# Patient Record
Sex: MALE | Race: WHITE | NOT HISPANIC OR LATINO | Employment: FULL TIME | URBAN - METROPOLITAN AREA
[De-identification: names, ages, dates, MRNs, and addresses within clinical notes are randomized per-mention and may not be internally consistent; named-entity substitution may affect disease eponyms.]

---

## 2020-03-30 ENCOUNTER — RECORDS - HEALTHEAST (OUTPATIENT)
Dept: LAB | Facility: CLINIC | Age: 55
End: 2020-03-30

## 2020-03-30 LAB
ALBUMIN SERPL-MCNC: 4.2 G/DL (ref 3.5–5)
ALP SERPL-CCNC: 46 U/L (ref 45–120)
ALT SERPL W P-5'-P-CCNC: 24 U/L (ref 0–45)
ANION GAP SERPL CALCULATED.3IONS-SCNC: 12 MMOL/L (ref 5–18)
AST SERPL W P-5'-P-CCNC: 18 U/L (ref 0–40)
BILIRUB SERPL-MCNC: 0.4 MG/DL (ref 0–1)
BUN SERPL-MCNC: 14 MG/DL (ref 8–22)
CALCIUM SERPL-MCNC: 9.6 MG/DL (ref 8.5–10.5)
CHLORIDE BLD-SCNC: 102 MMOL/L (ref 98–107)
CO2 SERPL-SCNC: 26 MMOL/L (ref 22–31)
CREAT SERPL-MCNC: 0.88 MG/DL (ref 0.7–1.3)
GFR SERPL CREATININE-BSD FRML MDRD: >60 ML/MIN/1.73M2
GLUCOSE BLD-MCNC: 84 MG/DL (ref 70–125)
POTASSIUM BLD-SCNC: 4.4 MMOL/L (ref 3.5–5)
PROT SERPL-MCNC: 6.9 G/DL (ref 6–8)
SODIUM SERPL-SCNC: 140 MMOL/L (ref 136–145)

## 2021-05-30 ENCOUNTER — RECORDS - HEALTHEAST (OUTPATIENT)
Dept: ADMINISTRATIVE | Facility: CLINIC | Age: 56
End: 2021-05-30

## 2022-09-16 NOTE — TELEPHONE ENCOUNTER
MEDICAL RECORDS REQUEST   [facility]  Urology Clinic  [address] [place] [address]  PHONE: [phone]  Fax: [phone]        FUTURE VISIT INFORMATION                                                   Johnnie Huggins, : 1965 scheduled for future visit at [facility]    APPOINTMENT INFORMATION:    Date: 2022    Provider:  Dior Sutherland MD    Reason for Visit/Diagnosis: Elevated PSA    RECORDS REQUESTED FOR VISIT                                                     NOTES  STATUS/DETAILS   OFFICE NOTE from other specialist  yes, 2022, 2021, 2019 -- Boston Early MD  @ [facility]   MEDICATION LIST  yes   LABS     PSA (LAB)  yes, 2022     PRE-VISIT CHECKLIST      Record collection complete Yes   Appointment appropriately scheduled           (right time/right provider) Yes   Joint diagnostic appointment coordinated correctly          (ensure right order & amount of time) Yes   MyChart activation If no, please explain pending   Questionnaire complete If no, please explain pending

## 2022-09-29 ENCOUNTER — VIRTUAL VISIT (OUTPATIENT)
Dept: UROLOGY | Facility: CLINIC | Age: 57
End: 2022-09-29
Payer: COMMERCIAL

## 2022-09-29 ENCOUNTER — TELEPHONE (OUTPATIENT)
Dept: UROLOGY | Facility: CLINIC | Age: 57
End: 2022-09-29

## 2022-09-29 ENCOUNTER — PRE VISIT (OUTPATIENT)
Dept: UROLOGY | Facility: CLINIC | Age: 57
End: 2022-09-29

## 2022-09-29 DIAGNOSIS — R97.20 ELEVATED PROSTATE SPECIFIC ANTIGEN (PSA): Primary | ICD-10-CM

## 2022-09-29 PROCEDURE — 99204 OFFICE O/P NEW MOD 45 MIN: CPT | Mod: GT | Performed by: UROLOGY

## 2022-09-29 RX ORDER — VENLAFAXINE HYDROCHLORIDE 75 MG/1
75 CAPSULE, EXTENDED RELEASE ORAL
COMMUNITY
Start: 2022-01-06

## 2022-09-29 RX ORDER — AMLODIPINE BESYLATE AND ATORVASTATIN CALCIUM 5; 10 MG/1; MG/1
TABLET, FILM COATED ORAL
COMMUNITY
Start: 2022-01-01

## 2022-09-29 RX ORDER — OMEGA-3/DHA/EPA/FISH OIL 300-1000MG
1200 CAPSULE ORAL
COMMUNITY

## 2022-09-29 RX ORDER — LOSARTAN POTASSIUM AND HYDROCHLOROTHIAZIDE 12.5; 5 MG/1; MG/1
TABLET ORAL
COMMUNITY
Start: 2022-01-01

## 2022-09-29 NOTE — NURSING NOTE
Patient denies any changes since echeck-in regarding medication and allergies.Patient also states all information entered during echeck-in remains accurate.    Patient states they are in [place] for today's virtual visit.     Idalia Hodgson, [facility] Visit [facility]

## 2022-09-29 NOTE — LETTER
9/29/2022       RE: Johnnie Huggins  [address] [address]     Dear Colleague,    Thank you for referring your patient, Johnnie Huggins, to the [facility]OLOG[facility] [place] at [facility]. Please see a copy of my visit note below.    Johnnie is a 56 year old who is being evaluated via a billable video visit.      How would you like to obtain your AVS? MyChart  If the video visit is dropped, the invitation should be resent by: Text to cell phone: [phone]  Will anyone else be joining your video visit? No        Video-Visit Details    Video Start Time: 230 PM     Type of service:  Video Visit        Chief Complaint:   Elevated PSA          Consult or Referral:     Mr. Johnnie Huggins is a 56 year old male seen in consultation from, second opinion          History of Present Illness:    Johnnie Huggins is a very pleasant 56 year old male who presents with elevated PSA. He reports lower urinary symptoms including frequency and weak stream. He drinks 24 oz of coffee every morning and a lot of water during the day. He suspects family history of prostate cancer in his father but he is not close to him and cannot confirm his suspicion.     He has seen Dr. Simms at [facility] and is scheduled for biopsy next week     Social status lives with his wife and son. Active lifestyle. Going through PT after sustaining back injury during volleyball game   ECOG status 0          Past Medical History:   Depression   Anxiety and HTN          Past Surgical History:   Appendectomy          Medications     Current Outpatient Medications   Medication     amLODIPine-atorvastatin (CADUET) 5-10 MG tablet     CHOLECALCIFEROL, VITAMIN D3, (VITAMIN D3 ORAL)     fish oil-omega-3 fatty acids 1000 MG capsule     losartan-hydrochlorothiazide (HYZAAR) 50-12.5 MG tablet     SUPER B COMPLEX/C PO     venlafaxine (EFFEXOR XR) 75 MG 24 hr capsule     cefuroxime (CEFTIN) 500  MG tablet     diazepam (VALIUM) 5 MG tablet     DULoxetine (CYMBALTA) 30 MG capsule     escitalopram oxalate (LEXAPRO) 20 MG tablet     LORazepam (ATIVAN) 1 MG tablet     multivit & mins-ferrous glucon 9 mg iron/15 mL Liqd     pantoprazole (PROTONIX) 40 MG tablet     traZODone (DESYREL) 100 MG tablet     No current facility-administered medications for this visit.            Family History:   No family history on file.         Social History:     Social History     Socioeconomic History     Marital status:      Spouse name: Not on file     Number of children: Not on file     Years of education: Not on file     Highest education level: Not on file   Occupational History     Not on file   Tobacco Use     Smoking status: Former Smoker     Types: Cigarettes     Quit date: 1/29/2007     Years since quitting: 15.6     Smokeless tobacco: Never Used   Substance and Sexual Activity     Alcohol use: Not on file     Drug use: Not on file     Sexual activity: Not on file   Other Topics Concern     Not on file   Social History Narrative     Not on file     Social Determinants of Health     Financial Resource Strain: Not on file   Food Insecurity: Not on file   Transportation Needs: Not on file   Physical Activity: Not on file   Stress: Not on file   Social Connections: Not on file   Intimate Partner Violence: Not on file   Housing Stability: Not on file            Allergies:   Patient has no known allergies.         Review of Systems     10 point ROS of systems including Constitutional, Eyes, Respiratory, Cardiovascular, Gastroenterology, Genitourinary, Integumentary, Muscularskeletal, Psychiatric were all negative except for pertinent positives noted in my HPI.          Physical Exam:   Vitals:  No vitals were obtained today due to virtual visit.    Physical Exam   GENERAL: Healthy, alert and no distress  EYES: Eyes grossly normal to inspection.  No discharge or erythema, or obvious scleral/conjunctival  abnormalities.  RESP: No audible wheeze, cough, or visible cyanosis.  No visible retractions or increased work of breathing.    SKIN: Visible skin clear. No significant rash, abnormal pigmentation or lesions.  NEURO: Cranial nerves grossly intact.  Mentation and speech appropriate for age.  PSYCH: Mentation appears normal, affect normal/bright, judgement and insight intact, normal speech and appearance well-groomed.          Labs and Pathology:    I reviewed all applicable laboratory and pathology data and went over findings with patient  Significant for mildly elevated PSA      Ref Range & Units 2 wk ago   Prostate Specific Antigen (PSA), Total 0 - 4 ng/mL 5.4 High     Prostate Specific Antigen (PSA), Free ng/mL 0.4    Prostate Specific Angtigen (PSA), Free Percent % 7                Imaging:    No relevant imaging to review today      Outside and Past Medical records:    I spent 10 minutes reviewing outside and past medical records.         Assessment and Plan:     Assessment:   56 year old male with elevated PSA     We discussed the emerging role of MRI in the diagnosis of prostate cancer and the potential for performing MRI-Ultrasound Fusion biopsy if suspicious lesions are noted.     We also discussed some of the new methods of risk stratification for prostate cancer including 4K, PHI, Exsome Dx to identify clinically significant and aggressive prostate cancer before biopsy. New data suggest that combination of these tests with MRI could avoid unnecessary prostate biopsy in significant percent of men with elevated PSA. Another available option in this group is MyProstateScore which provided 96% sensitivity and 97% negative predictive value, and would have prevented 32% of unnecessary biopsies, missing 3.7% of grade group ?2 cancers at the levels below 10.      Patient has decided he would like to proceed with prostate MRI as the initial step     He would like to move forward with his PSA work up before  considering any medication for his lower urinary tract symptoms       Plan:  Schedule for prostate MRI   Call with the results to discuss the next steps     45 total minutes spent on the date of the encounter including direct interaction with the patient, performing chart review, documentation and further activities as noted above.        Dior Sutherland MD   Department of Urology   [facility]         Video End Time:3:08 PM    Originating Location (pt. Location): Home    Distant Location (provider location):  [facility] [place]     Platform used for Video Visit: [employer]      Again, thank you for allowing me to participate in the care of your patient.      Sincerely,    Dior Sutherland MD

## 2022-09-29 NOTE — LETTER
Date:September 30, 2022      Patient was self referred, no letter generated. Do not send.        [facility] [place] [facility] Information

## 2022-09-29 NOTE — PROGRESS NOTES
Johnnie is a 56 year old who is being evaluated via a billable video visit.      How would you like to obtain your AVS? MyChart  If the video visit is dropped, the invitation should be resent by: Text to cell phone: [phone]  Will anyone else be joining your video visit? No        Video-Visit Details    Video Start Time: 230 PM     Type of service:  Video Visit        Chief Complaint:   Elevated PSA          Consult or Referral:     Mr. Johnnie Huggins is a 56 year old male seen in consultation from, second opinion          History of Present Illness:    Johnnie Huggins is a very pleasant 56 year old male who presents with elevated PSA. He reports lower urinary symptoms including frequency and weak stream. He drinks 24 oz of coffee every morning and a lot of water during the day. He suspects family history of prostate cancer in his father but he is not close to him and cannot confirm his suspicion.     He has seen Dr. Simms at [facility] and is scheduled for biopsy next week     Social status lives with his wife and son. Active lifestyle. Going through PT after sustaining back injury during volleyball game   ECOG status 0          Past Medical History:   Depression   Anxiety and HTN          Past Surgical History:   Appendectomy          Medications     Current Outpatient Medications   Medication     amLODIPine-atorvastatin (CADUET) 5-10 MG tablet     CHOLECALCIFEROL, VITAMIN D3, (VITAMIN D3 ORAL)     fish oil-omega-3 fatty acids 1000 MG capsule     losartan-hydrochlorothiazide (HYZAAR) 50-12.5 MG tablet     SUPER B COMPLEX/C PO     venlafaxine (EFFEXOR XR) 75 MG 24 hr capsule     cefuroxime (CEFTIN) 500 MG tablet     diazepam (VALIUM) 5 MG tablet     DULoxetine (CYMBALTA) 30 MG capsule     escitalopram oxalate (LEXAPRO) 20 MG tablet     LORazepam (ATIVAN) 1 MG tablet     multivit & mins-ferrous glucon 9 mg iron/15 mL Liqd     pantoprazole (PROTONIX) 40 MG tablet     traZODone (DESYREL) 100 MG tablet     No  current facility-administered medications for this visit.            Family History:   No family history on file.         Social History:     Social History     Socioeconomic History     Marital status:      Spouse name: Not on file     Number of children: Not on file     Years of education: Not on file     Highest education level: Not on file   Occupational History     Not on file   Tobacco Use     Smoking status: Former Smoker     Types: Cigarettes     Quit date: 1/29/2007     Years since quitting: 15.6     Smokeless tobacco: Never Used   Substance and Sexual Activity     Alcohol use: Not on file     Drug use: Not on file     Sexual activity: Not on file   Other Topics Concern     Not on file   Social History Narrative     Not on file     Social Determinants of Health     Financial Resource Strain: Not on file   Food Insecurity: Not on file   Transportation Needs: Not on file   Physical Activity: Not on file   Stress: Not on file   Social Connections: Not on file   Intimate Partner Violence: Not on file   Housing Stability: Not on file            Allergies:   Patient has no known allergies.         Review of Systems     10 point ROS of systems including Constitutional, Eyes, Respiratory, Cardiovascular, Gastroenterology, Genitourinary, Integumentary, Muscularskeletal, Psychiatric were all negative except for pertinent positives noted in my HPI.          Physical Exam:   Vitals:  No vitals were obtained today due to virtual visit.    Physical Exam   GENERAL: Healthy, alert and no distress  EYES: Eyes grossly normal to inspection.  No discharge or erythema, or obvious scleral/conjunctival abnormalities.  RESP: No audible wheeze, cough, or visible cyanosis.  No visible retractions or increased work of breathing.    SKIN: Visible skin clear. No significant rash, abnormal pigmentation or lesions.  NEURO: Cranial nerves grossly intact.  Mentation and speech appropriate for age.  PSYCH: Mentation appears normal,  affect normal/bright, judgement and insight intact, normal speech and appearance well-groomed.          Labs and Pathology:    I reviewed all applicable laboratory and pathology data and went over findings with patient  Significant for mildly elevated PSA      Ref Range & Units 2 wk ago   Prostate Specific Antigen (PSA), Total 0 - 4 ng/mL 5.4 High     Prostate Specific Antigen (PSA), Free ng/mL 0.4    Prostate Specific Angtigen (PSA), Free Percent % 7                Imaging:    No relevant imaging to review today      Outside and Past Medical records:    I spent 10 minutes reviewing outside and past medical records.         Assessment and Plan:     Assessment:   56 year old male with elevated PSA     We discussed the emerging role of MRI in the diagnosis of prostate cancer and the potential for performing MRI-Ultrasound Fusion biopsy if suspicious lesions are noted.     We also discussed some of the new methods of risk stratification for prostate cancer including 4K, PHI, Exsome Dx to identify clinically significant and aggressive prostate cancer before biopsy. New data suggest that combination of these tests with MRI could avoid unnecessary prostate biopsy in significant percent of men with elevated PSA. Another available option in this group is MyProstateScore which provided 96% sensitivity and 97% negative predictive value, and would have prevented 32% of unnecessary biopsies, missing 3.7% of grade group ?2 cancers at the levels below 10.      Patient has decided he would like to proceed with prostate MRI as the initial step     He would like to move forward with his PSA work up before considering any medication for his lower urinary tract symptoms       Plan:  Schedule for prostate MRI   Call with the results to discuss the next steps     45 total minutes spent on the date of the encounter including direct interaction with the patient, performing chart review, documentation and further activities as noted  above.        Dior Sutherland MD   Department of Urology   [facility]         Video End Time:3:08 PM    Originating Location (pt. Location): Home    Distant Location (provider location):  [facility] [place]     Platform used for Video Visit: [employer]

## 2022-11-16 ENCOUNTER — HOSPITAL ENCOUNTER (EMERGENCY)
Facility: CLINIC | Age: 57
Discharge: HOME | End: 2022-11-16
Attending: EMERGENCY MEDICINE | Admitting: EMERGENCY MEDICINE
Payer: COMMERCIAL

## 2022-11-16 VITALS — WEIGHT: 195 LBS | RESPIRATION RATE: 16 BRPM | TEMPERATURE: 98.4 F | OXYGEN SATURATION: 99 % | HEART RATE: 73 BPM

## 2022-11-16 DIAGNOSIS — R58 BLEEDING: ICD-10-CM

## 2022-11-16 PROCEDURE — 99282 EMERGENCY DEPT VISIT SF MDM: CPT

## 2022-11-16 ASSESSMENT — ENCOUNTER SYMPTOMS
ABDOMINAL PAIN: 0
FLANK PAIN: 0
BLOOD IN STOOL: 0
FEVER: 0
WOUND: 0
LIGHT-HEADEDNESS: 0
NAUSEA: 0
DYSURIA: 0
DIFFICULTY URINATING: 1
CHILLS: 0
DIARRHEA: 0
VOMITING: 0
BACK PAIN: 0
HEMATURIA: 1
SHORTNESS OF BREATH: 0

## 2022-11-16 NOTE — ED PROVIDER NOTES
EMERGENCY DEPARTMENT ENCOUNTER      NAME: Johnnie Huggins  AGE: 56 year old male  YOB: 1965  MRN: [id number]  EVALUATION DATE & TIME: No admission date for patient encounter.    PCP: No Ref-Primary, Physician    ED PROVIDER: Solange Colon PA-C      Chief Complaint   Patient presents with     Abnormal Bleeding Problem         FINAL IMPRESSION:  1. Bleeding          ED COURSE & MEDICAL DECISION MAKIN:14 AM Met with patient for initial interview. Plan for care discussed.  8:24 AM Staffed patient with Dr. Stone.  8:26 AM Dr. Stone met with patient. Patient elects to discharge and follow up at [facility] today.    Pertinent Labs & Imaging studies reviewed. (See chart for details)  56 year old male with a recent diagnosis of prostate cancer presents to the Emergency Department for evaluation of abnormal bleeding. Per chart review, patient underwent prostate biopsy and was diagnosed with prostate cancer on . Upon exam, patient is afebrile, hemodynamically stable, but appears very anxious. Brief PE done in triage,  exam planned once patient was roomed. Differential diagnosis includes external vs internal hemorrhoids, UTI, colon cancer, bladder cancer, bleeding secondary to recent biopsy. Plan was to order CBC, BMP, UA, and Fecal Occult Testing. This was relayed to patient by Dr. Stone, and after discussion, patient elected to discharge home without workup and go straight to [facility] for further evaluation. Recommend patient have work-up to determine source of bleed and confirm no evidence of anemia. Patient elected to leave against medical advice. Strongly advised patient follow up with his urologist/oncologist at [facility] as scheduled and return to an ER if any new or worsening symptoms develop. Patient was discharged AMA.     At the conclusion of the encounter I discussed the results of all of the tests and the disposition. The questions were answered. The patient or family acknowledged  understanding and was agreeable with the care plan.     MEDICATIONS GIVEN IN THE EMERGENCY:  Medications - No data to display    NEW PRESCRIPTIONS STARTED AT TODAY'S ER VISIT  Discharge Medication List as of 11/16/2022  8:37 AM             =================================================================    HPI    Patient information was obtained from: patient and patient's wife    Use of : N/A        Johnnie Huggins is a 56 year old male with a pertinent history of prostate cancer who presents to this ED for evaluation of abnormal bleeding. Per chart review, patient had a prostate biopsy on 11/04/22 and was diagnosed with primary prostate cancer. He has a PSMA PET scan scheduled later today at [facility]. Patient states he woke up this morning and noticed blood on his boxers, bedding, and genitals. He denies clots or current bleeding. He reports some associated penile/testicular discomfort. He admits to intermittent hematuria and mild dysuria over the last couple of days. He denies lightheadedness, fevers/chills, abdominal pain, diarrhea, nausea/vomiting, back or flank pain, chest pain, shortness of breath. Patient is not on any blood thinners.      REVIEW OF SYSTEMS   Review of Systems   Constitutional: Negative for chills and fever.   Respiratory: Negative for shortness of breath.    Cardiovascular: Negative for chest pain.   Gastrointestinal: Negative for abdominal pain, blood in stool, diarrhea, nausea and vomiting.   Genitourinary: Positive for difficulty urinating and hematuria. Negative for dysuria, flank pain, penile swelling and scrotal swelling.   Musculoskeletal: Negative for back pain.   Skin: Negative for wound.   Neurological: Negative for light-headedness.       PAST MEDICAL HISTORY:  No past medical history on file.    PAST SURGICAL HISTORY:  No past surgical history on file.        CURRENT MEDICATIONS:    amLODIPine-atorvastatin (CADUET) 5-10 MG tablet  cefuroxime (CEFTIN) 500 MG  tablet  CHOLECALCIFEROL, VITAMIN D3, (VITAMIN D3 ORAL)  diazepam (VALIUM) 5 MG tablet  DULoxetine (CYMBALTA) 30 MG capsule  escitalopram oxalate (LEXAPRO) 20 MG tablet  fish oil-omega-3 fatty acids 1000 MG capsule  LORazepam (ATIVAN) 1 MG tablet  losartan-hydrochlorothiazide (HYZAAR) 50-12.5 MG tablet  multivit & mins-ferrous glucon 9 mg iron/15 mL Liqd  pantoprazole (PROTONIX) 40 MG tablet  SUPER B COMPLEX/C PO  traZODone (DESYREL) 100 MG tablet  venlafaxine (EFFEXOR XR) 75 MG 24 hr capsule        ALLERGIES:  Allergies   Allergen Reactions     Pollen Extract Other (See Comments)     Sneezing and itchy watery eyes       FAMILY HISTORY:  No family history on file.    SOCIAL HISTORY:   Social History     Socioeconomic History     Marital status:    Tobacco Use     Smoking status: Former     Types: Cigarettes     Quit date: 1/29/2007     Years since quitting: 15.8     Smokeless tobacco: Never       VITALS:  Pulse 73   Temp 98.4  F (36.9  C) (Oral)   Resp 16   Wt 88.5 kg (195 lb)   SpO2 99%     PHYSICAL EXAM    Constitutional:  Alert, in no acute distress. Accompanied by his wife.  EYES: Conjunctivae clear.  HENT:  Atraumatic, normocephalic.  Respiratory:  Respirations even, unlabored, in no acute respiratory distress.  Cardiovascular:  Regular rate, good peripheral perfusion.  GI: Soft, flat, nondistended, no tenderness to palpation.   Musculoskeletal:  No edema. No cyanosis. Range of motion major extremities intact.    Integument: Warm, Dry, No erythema, No rash.   Neurologic:  Alert & oriented. No focal deficits noted.  Psych: Anxious.     Solange Colon PA-C  [facility] EMERGENCY ROOM  [address] [address] [phone]     Solange Colon PA-C  11/16/22 [id number]

## 2022-11-16 NOTE — ED PROVIDER NOTES
Emergency Department Staff Physician Note     I had a face to face encounter with this patient seen by the Advanced Practice Provider (ROSAS).  I have seen, examined, and discussed the patient with the ROSAS and agree with their assessment and plan of management.    Relevant HPI:     Johnnie Huggins is a 56 year old male who presents to the Emergency Department for evaluation of abnormal bleeding.     The patient woke up this morning with blood all over his lower abdomen and in his boxers. He is unsure where the bleeding is from, but endorses some discomfort in his genital area. He notes that he's been having intermittent episodes of blood in his urine. He also notes that he had a prostate biopsy 1.5 weeks ago (11/4/2022) at [facility] for his prostate cancer. He has an appointment to see a urologist, but he hasn't seen them yet.     Patient denies any diarrhea or abdominal pain.     I, Martin Bhakta, am serving as a scribe to document services personally performed by Warren Stone D.O., based on my observations and the provider's statements to me.   I, Warren Stone D.O., attest that Martin Bhakta was acting in a scribe capacity, has observed my performance of the services and has documented them in accordance with my direction.    ED Course:  8:15 AM I received the patient report from the ROSAS, Solange Colon, I agree with their assessment and plan of management, and I will see the patient.  8:25 AM I met with the patient to introduce myself, gather additional history, perform my initial exam, and discuss the plan.   8:33 AM Patient wished to be discharged so he could go to [facility] in [place].       Brief Physical Exam:   Vitals: Pulse 73   Temp 98.4  F (36.9  C) (Oral)   Resp 16   Wt 88.5 kg (195 lb)   SpO2 99%   General: Appears in no acute distress, awake, alert, interactive.  Eyes: Conjunctivae non-injected. Sclera anicteric.  HENT: Atraumatic, normocephalic  Neck: Supple, normal  ROM  Respiratory/Chest: Respiration unlabored, no tachypnea  Abdomen: non distended  Musculoskeletal: Normal extremities. No edema or erythema.  Skin: Normal color. No rash or diaphoresis.  Neurologic: Alert and oriented, face symmetric, moves all extremities spontaneously. Speech clear.  Psychiatric:  Affect normal, Judgment normal, Mood normal.         Impression / ED Plan:  I had a face to face encounter with this patient seen by the Advanced Practice Provider (ROSAS).  I have seen, examined, and discussed the patient with the ROSAS and agree with their assessment and plan of management. I personally saw the patient and performed a substantive portion of the visit including all aspects of the medical decision making.    Johnnie Huggins is a 56 year old male presents to the ED for evaluation of abnormal bleeding, possibly urinary source.  Patient had recent prostate biopsies, approximately 10 days ago, and started noticing blood this morning in his boxers.  Is uncertain whether it is coming from his penis or not, and he has some lower abdominal pressure.  Plan was to obtain labs, perform rectal exam to ensure there is no rectal bleeding, and then consult with urology.  However, before we had a chance to do this the patient decided that he would rather go to [facility] for evaluation and wished to be discharged AMA.  We did encourage him to do the testing however he wished to have it all done at a different facility and therefore we did allow him to leave AMA.    1. Bleeding        Warren Stone D.O.  Emergency Medicine  [facility] EMERGENCY ROOM  [address] [address]  [phone]  Dept: [phone]     Warren Stone, DO  11/16/22 1123

## 2022-11-16 NOTE — DISCHARGE INSTRUCTIONS
Please follow up with your urologist at [facility] as scheduled later today.     Return to the ER if any new or worsening symptoms develop including recurrent bleeding, penile/testicular/abdominal pain, chest pain, shortness of breath, fever/chills, lightheadedness.

## 2022-11-16 NOTE — ED TRIAGE NOTES
Pt had prostate biopsy 2 weeks ago.  Found out he has CA and was going to [place] later today.  This AM he woke with bleeding and blood in boxers and abd.  No wounds.  Believed to be bleeding from penis.   Triage Assessment     Row Name 11/16/22 0808       Triage Assessment (Adult)    Airway WDL WDL       Respiratory WDL    Respiratory WDL WDL       Skin Circulation/Temperature WDL    Skin Circulation/Temperature WDL WDL       Cardiac WDL    Cardiac WDL WDL       Peripheral/Neurovascular WDL    Peripheral Neurovascular WDL WDL       Cognitive/Neuro/Behavioral WDL    Cognitive/Neuro/Behavioral WDL WDL

## 2022-11-17 ENCOUNTER — LAB REQUISITION (OUTPATIENT)
Dept: LAB | Facility: CLINIC | Age: 57
End: 2022-11-17
Payer: COMMERCIAL

## 2022-11-17 DIAGNOSIS — J02.9 ACUTE PHARYNGITIS, UNSPECIFIED: ICD-10-CM

## 2022-11-17 PROCEDURE — 87081 CULTURE SCREEN ONLY: CPT | Mod: ORL | Performed by: FAMILY MEDICINE

## 2022-11-20 LAB — BACTERIA SPEC CULT: NORMAL

## 2022-12-26 ENCOUNTER — HEALTH MAINTENANCE LETTER (OUTPATIENT)
Age: 57
End: 2022-12-26

## 2024-02-04 ENCOUNTER — HEALTH MAINTENANCE LETTER (OUTPATIENT)
Age: 59
End: 2024-02-04

## 2024-09-03 ENCOUNTER — HOSPITAL ENCOUNTER (EMERGENCY)
Facility: CLINIC | Age: 59
Discharge: HOME | End: 2024-09-03
Attending: EMERGENCY MEDICINE | Admitting: EMERGENCY MEDICINE
Payer: COMMERCIAL

## 2024-09-03 ENCOUNTER — APPOINTMENT (OUTPATIENT)
Dept: ULTRASOUND IMAGING | Facility: CLINIC | Age: 59
End: 2024-09-03
Attending: EMERGENCY MEDICINE
Payer: COMMERCIAL

## 2024-09-03 ENCOUNTER — APPOINTMENT (OUTPATIENT)
Dept: GENERAL RADIOLOGY | Facility: CLINIC | Age: 59
End: 2024-09-03
Attending: EMERGENCY MEDICINE
Payer: COMMERCIAL

## 2024-09-03 VITALS
TEMPERATURE: 98.3 F | HEART RATE: 65 BPM | OXYGEN SATURATION: 94 % | SYSTOLIC BLOOD PRESSURE: 100 MMHG | RESPIRATION RATE: 18 BRPM | DIASTOLIC BLOOD PRESSURE: 69 MMHG

## 2024-09-03 DIAGNOSIS — D64.9 ANEMIA, UNSPECIFIED TYPE: ICD-10-CM

## 2024-09-03 DIAGNOSIS — I80.01 THROMBOPHLEBITIS OF SUPERFICIAL VEINS OF RIGHT LOWER EXTREMITY: ICD-10-CM

## 2024-09-03 LAB
ALBUMIN SERPL BCG-MCNC: 3.8 G/DL (ref 3.5–5.2)
ALP SERPL-CCNC: 66 U/L (ref 40–150)
ALT SERPL W P-5'-P-CCNC: 18 U/L (ref 0–70)
ANION GAP SERPL CALCULATED.3IONS-SCNC: 12 MMOL/L (ref 7–15)
AST SERPL W P-5'-P-CCNC: 20 U/L (ref 0–45)
BASOPHILS # BLD AUTO: 0 10E3/UL (ref 0–0.2)
BASOPHILS NFR BLD AUTO: 1 %
BILIRUB SERPL-MCNC: 0.3 MG/DL
BUN SERPL-MCNC: 16.1 MG/DL (ref 8–23)
CALCIUM SERPL-MCNC: 9 MG/DL (ref 8.8–10.4)
CHLORIDE SERPL-SCNC: 104 MMOL/L (ref 98–107)
CREAT SERPL-MCNC: 0.73 MG/DL (ref 0.67–1.17)
EGFRCR SERPLBLD CKD-EPI 2021: >90 ML/MIN/1.73M2
EOSINOPHIL # BLD AUTO: 0 10E3/UL (ref 0–0.7)
EOSINOPHIL NFR BLD AUTO: 0 %
ERYTHROCYTE [DISTWIDTH] IN BLOOD BY AUTOMATED COUNT: 16.4 % (ref 10–15)
GLUCOSE SERPL-MCNC: 122 MG/DL (ref 70–99)
HCO3 SERPL-SCNC: 23 MMOL/L (ref 22–29)
HCT VFR BLD AUTO: 28.5 % (ref 40–53)
HGB BLD-MCNC: 9.4 G/DL (ref 13.3–17.7)
IMM GRANULOCYTES # BLD: 0 10E3/UL
IMM GRANULOCYTES NFR BLD: 0 %
LYMPHOCYTES # BLD AUTO: 0.6 10E3/UL (ref 0.8–5.3)
LYMPHOCYTES NFR BLD AUTO: 8 %
MCH RBC QN AUTO: 31.9 PG (ref 26.5–33)
MCHC RBC AUTO-ENTMCNC: 33 G/DL (ref 31.5–36.5)
MCV RBC AUTO: 97 FL (ref 78–100)
MONOCYTES # BLD AUTO: 0.8 10E3/UL (ref 0–1.3)
MONOCYTES NFR BLD AUTO: 11 %
NEUTROPHILS # BLD AUTO: 5.7 10E3/UL (ref 1.6–8.3)
NEUTROPHILS NFR BLD AUTO: 80 %
NRBC # BLD AUTO: 0 10E3/UL
NRBC BLD AUTO-RTO: 0 /100
PLATELET # BLD AUTO: 231 10E3/UL (ref 150–450)
POTASSIUM SERPL-SCNC: 4.2 MMOL/L (ref 3.4–5.3)
PROT SERPL-MCNC: 5.8 G/DL (ref 6.4–8.3)
RBC # BLD AUTO: 2.95 10E6/UL (ref 4.4–5.9)
SODIUM SERPL-SCNC: 139 MMOL/L (ref 135–145)
WBC # BLD AUTO: 7.1 10E3/UL (ref 4–11)

## 2024-09-03 PROCEDURE — 80053 COMPREHEN METABOLIC PANEL: CPT | Performed by: EMERGENCY MEDICINE

## 2024-09-03 PROCEDURE — 250N000013 HC RX MED GY IP 250 OP 250 PS 637: Performed by: EMERGENCY MEDICINE

## 2024-09-03 PROCEDURE — 93971 EXTREMITY STUDY: CPT | Mod: RT

## 2024-09-03 PROCEDURE — 96374 THER/PROPH/DIAG INJ IV PUSH: CPT

## 2024-09-03 PROCEDURE — 99285 EMERGENCY DEPT VISIT HI MDM: CPT | Mod: 25

## 2024-09-03 PROCEDURE — 73610 X-RAY EXAM OF ANKLE: CPT | Mod: RT

## 2024-09-03 PROCEDURE — 85025 COMPLETE CBC W/AUTO DIFF WBC: CPT | Performed by: EMERGENCY MEDICINE

## 2024-09-03 PROCEDURE — 250N000011 HC RX IP 250 OP 636: Performed by: EMERGENCY MEDICINE

## 2024-09-03 PROCEDURE — 36415 COLL VENOUS BLD VENIPUNCTURE: CPT | Performed by: EMERGENCY MEDICINE

## 2024-09-03 RX ORDER — OXYCODONE HYDROCHLORIDE 5 MG/1
5 TABLET ORAL ONCE
Status: COMPLETED | OUTPATIENT
Start: 2024-09-03 | End: 2024-09-03

## 2024-09-03 RX ORDER — ONDANSETRON 2 MG/ML
4 INJECTION INTRAMUSCULAR; INTRAVENOUS ONCE
Status: COMPLETED | OUTPATIENT
Start: 2024-09-03 | End: 2024-09-03

## 2024-09-03 RX ADMIN — OXYCODONE HYDROCHLORIDE 5 MG: 5 TABLET ORAL at 02:14

## 2024-09-03 RX ADMIN — ONDANSETRON 4 MG: 2 INJECTION INTRAMUSCULAR; INTRAVENOUS at 04:20

## 2024-09-03 ASSESSMENT — COLUMBIA-SUICIDE SEVERITY RATING SCALE - C-SSRS
1. IN THE PAST MONTH, HAVE YOU WISHED YOU WERE DEAD OR WISHED YOU COULD GO TO SLEEP AND NOT WAKE UP?: NO
6. HAVE YOU EVER DONE ANYTHING, STARTED TO DO ANYTHING, OR PREPARED TO DO ANYTHING TO END YOUR LIFE?: NO
2. HAVE YOU ACTUALLY HAD ANY THOUGHTS OF KILLING YOURSELF IN THE PAST MONTH?: NO

## 2024-09-03 ASSESSMENT — ACTIVITIES OF DAILY LIVING (ADL)
ADLS_ACUITY_SCORE: 35

## 2024-09-03 NOTE — ED NOTES
Pt medicated with zofran for nausea- reports taking sleeping pill and thc gummy prior to arrival and believes the addition of oxycodone has caused the nausea- will po challenge prior to discharge

## 2024-09-03 NOTE — ED PROVIDER NOTES
Emergency Department Note      History of Present Illness     Chief Complaint   Leg Swelling    HPI   Johnnie Huggins is a 58 year old male with a history of prostate cancer metastatic to the liver, lung, and lymph nodes as well as hypertension, prediabetes, and anxiety who presents for evaluation of leg swelling. The patient reports that he has had right lower extremity swelling primarily around his ankle for a couple of weeks. States that the pain has worsened gradually and today pain is worse. Notes that he has been wearing compression socks and has no trauma or injury to the leg. Reports that he was put on a diuretic that he took without obvious relief. States that his weight did not decrease with this. Notes that he has undergone four out of six planned rounds of chemotherapy for his metastatic prostate cancer. Reports that he underwent surgery after being diagnosed with the prostate cancer. He denies chest pain, shortness of breath. Denies use of anticoagulants and blood thinners.    Independent Historian   None    Review of External Notes   I reviewed urology office visit from 8/8/24. Thought to be responding to current chemotherapy regimen and was recommended to undergo three more rounds.    Past Medical History     Medical History and Problem List   Diverticular disease  Gastritis  Hemorrhoids   Depression  Prostate cancer  Anxiety   PTSD  Prediabetes  Hypertension    Medications   Cefuroxime  Diazepam  Duloxetine  Escitalopram  Pantoprazole   Caduet   Hyzaar  Venlafaxine     Surgical History   Septorhinoplasty   Appendectomy  Prostatectomy     Physical Exam     Patient Vitals for the past 24 hrs:   BP Temp Pulse Resp SpO2   09/03/24 0500 100/69 -- 65 -- 94 %   09/03/24 0127 129/79 98.3  F (36.8  C) 83 18 95 %     Physical Exam  General: Sitting up in bed  Eyes:  The pupils are equal and round    Conjunctivae and sclerae are normal  ENT:    Atraumatic face  Neck:  Normal range pf motion  CV:  Regular rate,   regular rhythm     Skin warm and well perfused     DP/PT pulses 2+ on right side  Resp:  Non labored breathing on room air    No tachypnea    No cough heard    Lungs clear bilaterally  GI:  Abdomen is soft, there is no rigidity    No distension    No rebound tenderness     No abdominal tenderness  MS:  Swelling on right distal calf/ankle, full ROM of right ankle with only mild pain  Skin:  Slight erythema on right leg medial distal calf  Neuro:   Awake, alert.      Speech is normal and fluent.    Face is symmetric.     Moves all extremities equally    SILT on RLE  Psych: Normal affect.  Appropriate interactions.    Diagnostics     Lab Results   Labs Ordered and Resulted from Time of ED Arrival to Time of ED Departure   COMPREHENSIVE METABOLIC PANEL - Abnormal       Result Value    Sodium 139      Potassium 4.2      Carbon Dioxide (CO2) 23      Anion Gap 12      Urea Nitrogen 16.1      Creatinine 0.73      GFR Estimate >90      Calcium 9.0      Chloride 104      Glucose 122 (*)     Alkaline Phosphatase 66      AST 20      ALT 18      Protein Total 5.8 (*)     Albumin 3.8      Bilirubin Total 0.3     CBC WITH PLATELETS AND DIFFERENTIAL - Abnormal    WBC Count 7.1      RBC Count 2.95 (*)     Hemoglobin 9.4 (*)     Hematocrit 28.5 (*)     MCV 97      MCH 31.9      MCHC 33.0      RDW 16.4 (*)     Platelet Count 231      % Neutrophils 80      % Lymphocytes 8      % Monocytes 11      % Eosinophils 0      % Basophils 1      % Immature Granulocytes 0      NRBCs per 100 WBC 0      Absolute Neutrophils 5.7      Absolute Lymphocytes 0.6 (*)     Absolute Monocytes 0.8      Absolute Eosinophils 0.0      Absolute Basophils 0.0      Absolute Immature Granulocytes 0.0      Absolute NRBCs 0.0       Imaging   Ankle XR, G/E 3 views, right   Final Result   IMPRESSION: Normal joint spaces and alignment. No fracture.      US Lower Extremity Venous Duplex Right   Final Result   IMPRESSION:      1.  No deep venous thrombosis in the right  lower extremity.      2.  Focal, short segment superficial thrombus at the level of the ankle, likely less than 5 cm.        Independent Interpretation   None    ED Course      Medications Administered   Medications   oxyCODONE (ROXICODONE) tablet 5 mg (5 mg Oral $Given 9/3/24 0214)   ondansetron (ZOFRAN) injection 4 mg (4 mg Intravenous $Given 9/3/24 0420)     Procedures   Procedures     Discussion of Management   None    ED Course   ED Course as of 09/03/24 0819   Tue Sep 03, 2024   0141 I obtained history and examined the patient as noted above.    0402 I rechecked and updated the patient.        Additional Documentation  None    Medical Decision Making / Diagnosis     EMILY Huggins is a 58 year old male who presented to the emergency department with leg swelling.  Patient has had right leg swelling for several weeks.  In the last day, pain has worsened.  There is swelling at distal right lower extremity.  Ultrasound shows findings of superficial thrombophlebitis which is in the area of his pain and discomfort.  I do think that this fits with his symptoms.  No DVT.  Unremarkable x-ray of that area.  Patient has chronic anemia and similar to prior hemoglobin.  His white blood cell count is normal and he has had no fever, cellulitis is unlikely.  He does not seem to have significant pain with range of motion of his right ankle so septic joint is unlikely.  Gout seems less likely as well.  The length of the clot is less than 5 cm and it is distal superficial vein so lower risk though he does have malignancy.  I discussed options with the patient including calling his oncologist in the morning to discuss the findings and see if they want him to be on anticoagulation versus I would be comfortable prescribing anticoagulation as well.  Ultimately he decided that he will discuss this with his oncologist in the morning.  He already has left a message with his oncologist.  Patient has oxycodone at home for pain.  Also  recommended scheduling Tylenol.  Was going to update the patient 1 more time before discharge but a critical patient came in that I had to manage and nurse discussed discharge recommendations with the patient and they felt comfortable with discharge    Disposition   The patient was discharged.     Diagnosis     ICD-10-CM    1. Anemia, unspecified type  D64.9       2. Thrombophlebitis of superficial veins of right lower extremity  I80.01          Scribe Disclosure:  I, Megan Castellanos, am serving as a scribe at 1:48 AM on 9/3/2024 to document services personally performed by Kayce Tran MD based on my observations and the provider's statements to me.        Kayce Tran MD  09/03/24 0857

## 2024-09-03 NOTE — ED TRIAGE NOTES
Right leg pain and swelling. Patient is currently being treated for cancer. They started him on lasix but he has not had an ultrasound.

## 2024-09-03 NOTE — DISCHARGE INSTRUCTIONS
You have superficial thrombophlebitis on the ultrasound which I think is causing the pain you are having right now  Discuss with your oncologist about this and if they are okay with blood thinners or want to just follow-up this with repeat ultrasound  Tylenol 1000 mg every 8 hours for pain  Oxycodone for more severe pain  Elevate the leg  Warm compresses on the leg  Compression stocking may help a well

## 2024-12-12 ENCOUNTER — TRANSCRIBE ORDERS (OUTPATIENT)
Dept: OTHER | Age: 59
End: 2024-12-12

## 2024-12-12 DIAGNOSIS — C61 PROSTATE CANCER (H): Primary | ICD-10-CM

## 2025-01-07 NOTE — TELEPHONE ENCOUNTER
RECORDS STATUS - ALL OTHER DIAGNOSIS      RECORDS RECEIVED FROM: [place]   DATE RECEIVED:    NOTES STATUS DETAILS   OFFICE NOTE from urologist [facility] Dr. Jerome Estrella: 11/5/24   OFFICE NOTE from medical oncologist [facility] Dr. Andrea Aguiar: 11/12/24   OFFICE NOTE from radiation oncologist [facility] Dr. Jay Yan: 2/12/24   OPERATIVE REPORT [facility] 12/15/22: Prostatectomy   MEDICATION LIST [facility]   LABS     PATHOLOGY REPORTS Jean, Reports in , slides requested 1/7 5/29/24: [id number]  12/15/22: JR-  11/4/22: [id number]   ANYTHING RELATED TO DIAGNOSIS Epic/ 12/5/24   PATHOLOGY [facility] TRACKING   TRACKING #: [id number]   GENONOMIC TESTING     TYPE: [facility] 12/5/24: [id number]    1/3/24: [facility]   IMAGING (NEED IMAGES & REPORT)     MRI PACS 11/1/24, 12/5/23, 11/4/22: Jean   ULTRASOUND PACS 5/29/24: Jean   PET PACS 11/1/24, 8/8/24, 5/21/24, 11/20/23, 11/16/22: Jean

## 2025-01-14 NOTE — PROGRESS NOTES
Virtual Visit Details  Type of service:  Video Visit   Originating Location (pt. Location):  Home    Distant Location (provider location):  [facility]  Platform used for Video Visit:  Willie    -------------------------------------------------------------------------------------------------------------------------------------------------------------------------------    NEW PATIENT SECOND OPINION CONSULTATION  -  TELEMEDICINE    Reason for Visit:  Amphicrine-type mCRPC (AR+, PSA-, PSMA+) with TP53 mutation.  Currently on 177Lu-PSMA-617 treatment.    History of Present Illness:  Dear DrsJamilah Aguiar and Tacos Boston,    Thank you for referring your patient for a Second Opinion Consultation at the [facility].  A brief overview of his oncological history as well as my recommendations follow below.    Mr. Huggins is a 59-year-old man who was diagnosed with high-grade prostate cancer in late 2022.  His PSA level in 9/2022 was 5.4 ng/mL.  He underwent a prostatic biopsy on 11/4/2022, which revealed multiple cores of [place] 4+4=8 adenocarcinoma with intraductal morphology.  He underwent a PSMA-PET scan on 11/16/2022, which showed a solitary metastatic perirectal lymph node, without osseous lesions.    On 12/15/2022, he underwent a radical prostatectomy.  This revealed Jos[place]at 4+5=9 prostatic adenocarcinoma, with 2 (of 33) positive lymph nodes, and a final pathological stage of pT3a N1 R0.  In 11/2023, he began ADT plus abiraterone in conjunction with salvage radiotherapy.  The pelvic radiation treatment was completed on 2/10/2024.    In 5/2024, he developed mCRPC despite an undetectable PSA level.  PSMA-PET imaging at that time showed new lung and liver metastases with high PSMA activity.  He underwent a liver biopsy on 10/29/2024, which revealed an AR+/PSA- high-grade adenocarcinoma.    On 6/14/2024, he began combination chemotherapy using docetaxel and  carboplatin, with the addition of darolutamide.  He completed 6 cycles of this chemotherapy on 9/30/2024.  However, by 11/5/2024, his PSMA-PET scan showed further progression of PSMA-avid hepatic metastases numbering about 20 lesions.    The patient is currently receiving radioligand therapy with 177Lu-PSMA-617.  His first dose was delivered on 12/5/2024.  His second cycle of 177Lu-PSMA-617 is planned for 1/16/2025.  The patient presents today for a Second Opinion consultation in order to discuss his management options moving forward.    [id number] analysis (5/23/2024):  TP53 mutation, p.C135R, VAF 12.9% ---> VAF dropped to 0.5% on 12/5/2024  TMB 5.6 muts/Mb    Review of Systems:  The patient reports feeling generally well.  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  He does report some fatigue as well as occasional hot flashes.  He has not lost or gained any weight recently.  He does not have any cancer-related bone pain.  He does not report any right upper quadrant discomfort.  A comprehensive 14-system review of symptoms is otherwise generally within normal limits.  His ECOG score is 0.  His pain score is 0/10.    Past Medical History:  Advanced prostate cancer, amphicrine variant  Essential hypertension  Seasonal allergies  Gastroesophageal reflux disease  Generalized anxiety disorder    Medications:  Leuprolide (last dose, 12/5/2024)  Amlodipine  Telmisartan  Famotidine  Gabapentin  Lorazepam  Olanzapine  Fish oil  Calcium  Vitamin D    Allergies:  Pollen allergy.    Social History:  The patient lives in [place], [place].  He is  to his wife, Glenis.  He works as a  at [employer] [email or web address]  He has a 10-pack-year smoking history, but quit smoking in 2005.  He stopped drinking alcohol in 2010.    Family History:  His father had a diagnosis of prostate cancer.  His brother had bladder cancer.  His sister had breast cancer.  His germline  genetic testing ([facility]) was unremarkable.      ASSESSMENT AND PLAN:  Mr. Huggins is a 59-year-old man with amphicrine-type mCRPC (AR+, PSA-, PSMA+) with a TP53 mutation.  His disease currently involves the lungs and liver, with a significant hepatic burden.  His PSA level remains undetectable.  His previous systemic therapies include ADT/abiraterone, then docetaxel/carboplatin with darolutamide, and now 177Lu-PSMA-617 treatment.  His ECOG score is 0.  His pain score is 0/10.    This patient has an unusual prostate cancer phenotype which is consistent with AR/PSMA-positive disease with an absence of intratumoral or peripheral PSA expression.  This makes monitoring his disease more difficult, with reliance upon imaging modalities rather than serologic markers.  The patient has previously received two novel ARPI agents as well as a taxane-based combination chemotherapy.  Due to the persistence of PSMA-avid disease (11/5/2024), the use of 177Lu-PSMA-617 treatment was a reasonable next approach.  This began on 12/5/2024.  He is soon scheduled to receive his second dose of 177Lu-PSMA-617, with a plan to deliver up to 6 doses.    We took some time today to review potential management options for the future, if his cancer becomes resistant to 177Lu-PSMA-617 moving forward.  This would likely require more chemotherapy, such as the combination of cabazitaxel plus carboplatin.  Alternatively, a separate chemotherapy regimen could include cisplatin plus etoposide.  In addition, I eagerly await the results of the tissue-based and a blood-based NGS assays from [facility] in case we discover any actionable genetic alterations or a TMB of >10 muts/Mb.    I also spent some time reviewing our clinical trial portfolio with him.  In the future, he might potentially be eligible for the ELLIE-280 study utilizing a [id number] CD3-engaging bispecific antibody agent.  We we will also soon open the Convergent-01 trial consisting of a PSMA-targeting  radioantibody that delivers Ac225.  There might also be additional clinical trials offered by our [facility] (Phase 1 Trial Group).  Furthermore, there are other institutions that specialize in amphicrine or aggressive-variant prostate cancers such as [facility] or [facility].  He may consider a consultation at one of those centers.    A total of 80 minutes were spent on this patient on the date of the encounter conducting chart review, review of test results, interpretation of tests, patient visit, documentation and discussion with other provider(s).  The patient was given the opportunity to ask multiple questions today, all of which were answered to his satisfaction.    Nicanor Fitzpatrick M.D.

## 2025-01-15 ENCOUNTER — VIRTUAL VISIT (OUTPATIENT)
Dept: ONCOLOGY | Facility: CLINIC | Age: 60
End: 2025-01-15
Attending: INTERNAL MEDICINE
Payer: COMMERCIAL

## 2025-01-15 ENCOUNTER — PRE VISIT (OUTPATIENT)
Dept: ONCOLOGY | Facility: CLINIC | Age: 60
End: 2025-01-15
Payer: COMMERCIAL

## 2025-01-15 ENCOUNTER — LAB REQUISITION (OUTPATIENT)
Dept: LAB | Facility: CLINIC | Age: 60
End: 2025-01-15
Payer: COMMERCIAL

## 2025-01-15 VITALS
SYSTOLIC BLOOD PRESSURE: 115 MMHG | HEIGHT: 72 IN | WEIGHT: 210 LBS | BODY MASS INDEX: 28.44 KG/M2 | DIASTOLIC BLOOD PRESSURE: 72 MMHG

## 2025-01-15 DIAGNOSIS — C61 MALIGNANT NEOPLASM OF PROSTATE (H): Primary | ICD-10-CM

## 2025-01-15 PROCEDURE — 88342 IMHCHEM/IMCYTCHM 1ST ANTB: CPT | Mod: TC | Performed by: INTERNAL MEDICINE

## 2025-01-15 PROCEDURE — 98003 SYNCH AUDIO-VIDEO NEW HI 60: CPT | Performed by: INTERNAL MEDICINE

## 2025-01-15 PROCEDURE — 88321 CONSLTJ&REPRT SLD PREP ELSWR: CPT | Performed by: PATHOLOGY

## 2025-01-15 PROCEDURE — 88341 IMHCHEM/IMCYTCHM EA ADD ANTB: CPT | Mod: TC | Performed by: INTERNAL MEDICINE

## 2025-01-15 RX ORDER — VENLAFAXINE HYDROCHLORIDE 150 MG/1
150 CAPSULE, EXTENDED RELEASE ORAL DAILY
COMMUNITY
Start: 2024-12-17

## 2025-01-15 ASSESSMENT — PAIN SCALES - GENERAL: PAINLEVEL_OUTOF10: NO PAIN (0)

## 2025-01-15 NOTE — LETTER
1/15/2025      Johnnie Huggins  [address] [address]      Dear Colleague,    Thank you for referring your patient, Johnnie Huggins, to the [facility]. Please see a copy of my visit note below.      Virtual Visit Details  Type of service:  Video Visit   Originating Location (pt. Location):  Home    Distant Location (provider location):  [facility]  Platform used for Video Visit:  AmWell    -------------------------------------------------------------------------------------------------------------------------------------------------------------------------------    NEW PATIENT SECOND OPINION CONSULTATION  -  TELEMEDICINE    Reason for Visit:  Amphicrine-type mCRPC (AR+, PSA-, PSMA+) with TP53 mutation.  Currently on 177Lu-PSMA-617 treatment.    History of Present Illness:  Dear Guillermina Aguiar and Tacos Boston,    Thank you for referring your patient for a Second Opinion Consultation at the [facility].  A brief overview of his oncological history as well as my recommendations follow below.    Mr. Huggins is a 59-year-old man who was diagnosed with high-grade prostate cancer in late 2022.  His PSA level in 9/2022 was 5.4 ng/mL.  He underwent a prostatic biopsy on 11/4/2022, which revealed multiple cores of [place] 4+4=8 adenocarcinoma with intraductal morphology.  He underwent a PSMA-PET scan on 11/16/2022, which showed a solitary metastatic perirectal lymph node, without osseous lesions.    On 12/15/2022, he underwent a radical prostatectomy.  This revealed [place] 4+5=9 prostatic adenocarcinoma, with 2 (of 33) positive lymph nodes, and a final pathological stage of pT3a N1 R0.  In 11/2023, he began ADT plus abiraterone in conjunction with salvage radiotherapy.  The pelvic radiation treatment was completed on 2/10/2024.    In 5/2024, he developed mCRPC despite an undetectable PSA level.  PSMA-PET imaging at  that time showed new lung and liver metastases with high PSMA activity.  He underwent a liver biopsy on 10/29/2024, which revealed an AR+/PSA- high-grade adenocarcinoma.    On 6/14/2024, he began combination chemotherapy using docetaxel and carboplatin, with the addition of darolutamide.  He completed 6 cycles of this chemotherapy on 9/30/2024.  However, by 11/5/2024, his PSMA-PET scan showed further progression of PSMA-avid hepatic metastases numbering about 20 lesions.    The patient is currently receiving radioligand therapy with 177Lu-PSMA-617.  His first dose was delivered on 12/5/2024.  His second cycle of 177Lu-PSMA-617 is planned for 1/16/2025.  The patient presents today for a Second Opinion consultation in order to discuss his management options moving forward.    [id number] analysis (5/23/2024):  TP53 mutation, p.C135R, VAF 12.9% ---> VAF dropped to 0.5% on 12/5/2024  TMB 5.6 muts/Mb    Review of Systems:  The patient reports feeling generally well.  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  He does report some fatigue as well as occasional hot flashes.  He has not lost or gained any weight recently.  He does not have any cancer-related bone pain.  He does not report any right upper quadrant discomfort.  A comprehensive 14-system review of symptoms is otherwise generally within normal limits.  His ECOG score is 0.  His pain score is 0/10.    Past Medical History:  Advanced prostate cancer, amphicrine variant  Essential hypertension  Seasonal allergies  Gastroesophageal reflux disease  Generalized anxiety disorder    Medications:  Leuprolide (last dose, 12/5/2024)  Amlodipine  Telmisartan  Famotidine  Gabapentin  Lorazepam  Olanzapine  Fish oil  Calcium  Vitamin D    Allergies:  Pollen allergy.    Social History:  The patient lives in [place], [place].  He is  to his wife, Glenis.  He works as a  at [email or web address] [email or web address]  He has a  10-pack-year smoking history, but quit smoking in 2005.  He stopped drinking alcohol in 2010.    Family History:  His father had a diagnosis of prostate cancer.  His brother had bladder cancer.  His sister had breast cancer.  His germline genetic testing ([facility]) was unremarkable.      ASSESSMENT AND PLAN:  Mr. Huggins is a 59-year-old man with amphicrine-type mCRPC (AR+, PSA-, PSMA+) with a TP53 mutation.  His disease currently involves the lungs and liver, with a significant hepatic burden.  His PSA level remains undetectable.  His previous systemic therapies include ADT/abiraterone, then docetaxel/carboplatin with darolutamide, and now 177Lu-PSMA-617 treatment.  His ECOG score is 0.  His pain score is 0/10.    This patient has an unusual prostate cancer phenotype which is consistent with AR/PSMA-positive disease with an absence of intratumoral or peripheral PSA expression.  This makes monitoring his disease more difficult, with reliance upon imaging modalities rather than serologic markers.  The patient has previously received two novel ARPI agents as well as a taxane-based combination chemotherapy.  Due to the persistence of PSMA-avid disease (11/5/2024), the use of 177Lu-PSMA-617 treatment was a reasonable next approach.  This began on 12/5/2024.  He is soon scheduled to receive his second dose of 177Lu-PSMA-617, with a plan to deliver up to 6 doses.    We took some time today to review potential management options for the future, if his cancer becomes resistant to 177Lu-PSMA-617 moving forward.  This would likely require more chemotherapy, such as the combination of cabazitaxel plus carboplatin.  Alternatively, a separate chemotherapy regimen could include cisplatin plus etoposide.  In addition, I eagerly await the results of the tissue-based and a blood-based NGS assays from [facility] in case we discover any actionable genetic alterations or a TMB of >10 muts/Mb.    I also spent some time reviewing our clinical trial  portfolio with him.  In the future, he might potentially be eligible for the ELLIE-280 study utilizing a [id number] CD3-engaging bispecific antibody agent.  We we will also soon open the Convergent-01 trial consisting of a PSMA-targeting radioantibody that delivers Ac225.  There might also be additional clinical trials offered by our [facility] (Phase 1 Trial Group).  Furthermore, there are other institutions that specialize in amphicrine or aggressive-variant prostate cancers such as [facility] or [facility] [place].  He may consider a consultation at one of those centers.    A total of 80 minutes were spent on this patient on the date of the encounter conducting chart review, review of test results, interpretation of tests, patient visit, documentation and discussion with other provider(s).  The patient was given the opportunity to ask multiple questions today, all of which were answered to his satisfaction.    Nicanor Fitzpatrick M.D.      Again, thank you for allowing me to participate in the care of your patient.        Sincerely,        Nicanor Fitzpatrick MD    Electronically signed

## 2025-01-15 NOTE — NURSING NOTE
Is the patient currently in the state of [place]? YES    Current patient location:  [address] [place] [address]    Visit mode:Video    If the visit is dropped, the patient can be reconnected by:  [email or web address]    Will anyone else be joining the visit? No  (If patient encounters technical issues they should call [phone])    Are changes needed to the allergy or medication list? Yes see med list, pt flagged meds for removal    Are refills needed on medications prescribed by this physician? No    Rooming Documentation: Unable to complete qnrs due to time.    Reason for visit: Consult     ROLAND Whitney

## 2025-01-30 LAB
PATH REPORT.COMMENTS IMP SPEC: ABNORMAL
PATH REPORT.COMMENTS IMP SPEC: YES
PATH REPORT.FINAL DX SPEC: ABNORMAL
PATH REPORT.GROSS SPEC: ABNORMAL
PATH REPORT.RELEVANT HX SPEC: ABNORMAL
PATH REPORT.RELEVANT HX SPEC: ABNORMAL
PATH REPORT.SITE OF ORIGIN SPEC: ABNORMAL

## 2025-03-01 NOTE — PROGRESS NOTES
FOLLOW UP VISIT    Reason for Visit:  Amphicrine-type mCRPC (AR+, PSA-, PSMA+) with TP53 mutation.  Status post ADT/abiraterone, docetaxel/carboplatin, and darolutamide.  Currently on 177Lu-PSMA-617 treatment (cycle #3 on 2/27/25).    History of Present Illness:  Mr. Huggins is a 59-year-old man who was diagnosed with high-grade prostate cancer in late 2022.  His PSA level in 9/2022 was 5.4 ng/mL.  He underwent a prostatic biopsy on 11/4/2022, which revealed multiple cores of [place] 4+4=8 adenocarcinoma with intraductal morphology.  He underwent a PSMA-PET scan on 11/16/2022, which showed a solitary metastatic perirectal lymph node, without osseous lesions.    On 12/15/2022, he underwent a radical prostatectomy.  This revealed [place] 4+5=9 prostatic adenocarcinoma, with 2 (of 33) positive lymph nodes, and a final pathological stage of pT3a N1 R0.  In 11/2023, he began ADT plus abiraterone in conjunction with salvage radiotherapy.  The pelvic radiation treatment was completed on 2/10/2024.    In 5/2024, he developed mCRPC despite an undetectable PSA level.  PSMA-PET imaging at that time showed new lung and liver metastases with high PSMA activity.  He underwent a liver biopsy on 10/29/2024, which revealed an AR+/PSA- high-grade adenocarcinoma.    On 6/14/2024, he began combination chemotherapy using docetaxel and carboplatin, with the addition of darolutamide.  He completed 6 cycles of this chemotherapy on 9/30/2024.  However, by 11/5/2024 his PSMA-PET scan showed further progression of PSMA-avid hepatic metastases numbering about 20 lesions.    He then began radioligand therapy with 177Lu-PSMA-617.  His first dose was delivered on 12/5/2024.  His second cycle was on 1/16/2025, and the 3rd cycle was on 2/27/2025.  His PSA level still remains undetectable (<0.1 ng/mL).  His PSMA-PET scan (2/26/2025) shows improvement of liver lesions.  He returns today for a consultation in order to discuss additional management  options for amphicrine-like prostate cancer.    [id number] analysis (5/23/2024):  TP53 mutation, p.C135R, VAF 12.9% on 5/23/24 ---> VAF 0.5% on 12/5/24 ---> VAF 44.4% on 1/7/25  TMB 5.6 muts/Mb    Review of Systems:  The patient reports feeling generally well.  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  He does report some fatigue as well as occasional hot flashes.  He has not lost or gained any weight recently.  He does not have any cancer-related bone pain.  He does not report any right upper quadrant discomfort.  A comprehensive 14-system review of symptoms is otherwise generally within normal limits.  His ECOG score is 0.  His pain score is 0/10.    Past Medical History:  Advanced prostate cancer, amphicrine-like  Essential hypertension  Seasonal allergies  Gastroesophageal reflux disease  Generalized anxiety disorder    Medications:  Leuprolide (last dose, 12/5/2024)  Amlodipine  Telmisartan  Famotidine  Gabapentin  Lorazepam  Olanzapine  Fish oil  Calcium  Vitamin D    PSMA-PET scan (2/26/2025):  There has been a substantial reduction in the tracer localization within the bilobar hepatic metastases. The uptake is now only slightly above background liver, image 172 and 193 for example. No pulmonary nodular uptake.  Negative bone lesions.  Decreasing faint uptake posterior right 7th rib. There is some adjacent sclerosis within the ribs. This may represent a metastasis with decreasing uptake.      ASSESSMENT AND PLAN:  Mr. Huggins is a 59-year-old man with amphicrine-like mCRPC (AR+, PSA-, PSMA+) with a TP53 mutation.  His disease currently involves the lungs and liver.  His PSA level remains undetectable.  His previous systemic therapies include ADT/abiraterone, then docetaxel/carboplatin with darolutamide, and now 177Lu-PSMA-617 treatment (cycle #3 on 2/27/25).  His ECOG score is 0.  His pain score is 0/10.    This patient has an unusual prostate cancer phenotype which is  consistent with AR/PSMA-positive disease with an absence of intratumoral or peripheral PSA expression.  This makes monitoring his disease more difficult, with reliance upon imaging modalities rather than serologic markers.  The patient has previously received two novel ARPI agents as well as a taxane-based combination chemotherapy.  Due to the persistence of PSMA-avid disease (11/5/2024), the use of 177Lu-PSMA-617 treatment was a reasonable approach.  This began on 12/5/2024.  He received his 3rd dose of 177Lu-PSMA-617 on 2/27/2025, with a plan to deliver up to 6 doses.  His PSMA-PET scan from 2/26/2025 showed improvement of liver lesions (see report, above).      We took some time today to review potential management options for the future, if his cancer becomes resistant to 177Lu-PSMA-617 moving forward.  This would likely require more chemotherapy, such as the combination of cabazitaxel plus carboplatin.  Alternatively, a separate chemotherapy regimen could include cisplatin plus etoposide.  These could be delivered by Dr. Aguiar.  In addition, I eagerly await the results of the tissue-based and a blood-based NGS assays from [facility] in case we discover any actionable genetic alterations or a TMB of >10 muts/Mb.  Unfortunately, his most recent liquid biopsy analysis (1/7/2025) continues to show a low TMB of <10 muts/Mb    I also spent some time reviewing our clinical trial portfolio with him.  In the future, he might potentially be eligible for the ELLIE-280 study utilizing a [id number] CD3-engaging bispecific antibody agent.  Another option in the Convergent-01 trial consisting of a PSMA-targeting radioantibody that delivers Ac225.  There might also be additional clinical trials offered by our [facility] (Phase 1 Trial Group).  Furthermore, there are other institutions that specialize in amphicrine or aggressive-variant prostate cancers such as [facility] or [facility].  He may consider a consultation at one of those centers.  ADDENDUM: We have decided not to open the Convergent-01 trial, and the ELLIE-280 trial appears to be closing due to lack of efficacy.  We are planning to open some alternative trials in the future (ARDX-0405, Xhqu346, YXQ226, ACE-232) but these are not available yet.    A total of 40 minutes were spent on this patient on the date of the encounter conducting chart review, review of test results, interpretation of tests, patient visit, documentation and discussion with other provider(s).  The patient was given the opportunity to ask multiple questions today, all of which were answered to his satisfaction.    Nicanor Fitzpatrick M.D.

## 2025-03-02 ENCOUNTER — HEALTH MAINTENANCE LETTER (OUTPATIENT)
Age: 60
End: 2025-03-02

## 2025-03-03 ENCOUNTER — ONCOLOGY VISIT (OUTPATIENT)
Dept: ONCOLOGY | Facility: CLINIC | Age: 60
End: 2025-03-03
Attending: INTERNAL MEDICINE
Payer: COMMERCIAL

## 2025-03-03 VITALS
SYSTOLIC BLOOD PRESSURE: 114 MMHG | DIASTOLIC BLOOD PRESSURE: 73 MMHG | WEIGHT: 210 LBS | HEART RATE: 79 BPM | OXYGEN SATURATION: 97 % | BODY MASS INDEX: 28.48 KG/M2 | TEMPERATURE: 98.1 F | RESPIRATION RATE: 18 BRPM

## 2025-03-03 DIAGNOSIS — C61 MALIGNANT NEOPLASM OF PROSTATE (H): Primary | ICD-10-CM

## 2025-03-03 PROCEDURE — 99213 OFFICE O/P EST LOW 20 MIN: CPT | Performed by: INTERNAL MEDICINE

## 2025-03-03 PROCEDURE — 99215 OFFICE O/P EST HI 40 MIN: CPT | Performed by: INTERNAL MEDICINE

## 2025-03-03 RX ORDER — ALPRAZOLAM 1 MG/1
1 TABLET ORAL
COMMUNITY
Start: 2024-12-12

## 2025-03-03 RX ORDER — TELMISARTAN 40 MG/1
1 TABLET ORAL DAILY
COMMUNITY
Start: 2024-05-07

## 2025-03-03 ASSESSMENT — PAIN SCALES - GENERAL: PAINLEVEL_OUTOF10: MODERATE PAIN (6)

## 2025-03-03 NOTE — LETTER
3/3/2025      Johnnie Huggins  [address] [address]      Dear Colleague,    Thank you for referring your patient, Johnnie Huggins, to the [facility]. Please see a copy of my visit note below.      FOLLOW UP VISIT    Reason for Visit:  Amphicrine-type mCRPC (AR+, PSA-, PSMA+) with TP53 mutation.  Status post ADT/abiraterone, docetaxel/carboplatin, and darolutamide.  Currently on 177Lu-PSMA-617 treatment (cycle #3 on 2/27/25).    History of Present Illness:  Mr. Huggins is a 59-year-old man who was diagnosed with high-grade prostate cancer in late 2022.  His PSA level in 9/2022 was 5.4 ng/mL.  He underwent a prostatic biopsy on 11/4/2022, which revealed multiple cores of [place] 4+4=8 adenocarcinoma with intraductal morphology.  He underwent a PSMA-PET scan on 11/16/2022, which showed a solitary metastatic perirectal lymph node, without osseous lesions.    On 12/15/2022, he underwent a radical prostatectomy.  This revealed [place] 4+5=9 prostatic adenocarcinoma, with 2 (of 33) positive lymph nodes, and a final pathological stage of pT3a N1 R0.  In 11/2023, he began ADT plus abiraterone in conjunction with salvage radiotherapy.  The pelvic radiation treatment was completed on 2/10/2024.    In 5/2024, he developed mCRPC despite an undetectable PSA level.  PSMA-PET imaging at that time showed new lung and liver metastases with high PSMA activity.  He underwent a liver biopsy on 10/29/2024, which revealed an AR+/PSA- high-grade adenocarcinoma.    On 6/14/2024, he began combination chemotherapy using docetaxel and carboplatin, with the addition of darolutamide.  He completed 6 cycles of this chemotherapy on 9/30/2024.  However, by 11/5/2024 his PSMA-PET scan showed further progression of PSMA-avid hepatic metastases numbering about 20 lesions.    He then began radioligand therapy with 177Lu-PSMA-617.  His first dose was delivered on 12/5/2024.  His second cycle was on 1/16/2025,  and the 3rd cycle was on 2/27/2025.  His PSA level still remains undetectable (<0.1 ng/mL).  His PSMA-PET scan (2/26/2025) shows improvement of liver lesions.  He returns today for a consultation in order to discuss additional management options for amphicrine-like prostate cancer.    [id number] analysis (5/23/2024):  TP53 mutation, p.C135R, VAF 12.9% on 5/23/24 ---> VAF 0.5% on 12/5/24 ---> VAF 44.4% on 1/7/25  TMB 5.6 muts/Mb    Review of Systems:  The patient reports feeling generally well.  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  He does report some fatigue as well as occasional hot flashes.  He has not lost or gained any weight recently.  He does not have any cancer-related bone pain.  He does not report any right upper quadrant discomfort.  A comprehensive 14-system review of symptoms is otherwise generally within normal limits.  His ECOG score is 0.  His pain score is 0/10.    Past Medical History:  Advanced prostate cancer, amphicrine-like  Essential hypertension  Seasonal allergies  Gastroesophageal reflux disease  Generalized anxiety disorder    Medications:  Leuprolide (last dose, 12/5/2024)  Amlodipine  Telmisartan  Famotidine  Gabapentin  Lorazepam  Olanzapine  Fish oil  Calcium  Vitamin D    PSMA-PET scan (2/26/2025):  There has been a substantial reduction in the tracer localization within the bilobar hepatic metastases. The uptake is now only slightly above background liver, image 172 and 193 for example. No pulmonary nodular uptake.  Negative bone lesions.  Decreasing faint uptake posterior right 7th rib. There is some adjacent sclerosis within the ribs. This may represent a metastasis with decreasing uptake.      ASSESSMENT AND PLAN:  Mr. Huggins is a 59-year-old man with amphicrine-like mCRPC (AR+, PSA-, PSMA+) with a TP53 mutation.  His disease currently involves the lungs and liver.  His PSA level remains undetectable.  His previous systemic therapies include  ADT/abiraterone, then docetaxel/carboplatin with darolutamide, and now 177Lu-PSMA-617 treatment (cycle #3 on 2/27/25).  His ECOG score is 0.  His pain score is 0/10.    This patient has an unusual prostate cancer phenotype which is consistent with AR/PSMA-positive disease with an absence of intratumoral or peripheral PSA expression.  This makes monitoring his disease more difficult, with reliance upon imaging modalities rather than serologic markers.  The patient has previously received two novel ARPI agents as well as a taxane-based combination chemotherapy.  Due to the persistence of PSMA-avid disease (11/5/2024), the use of 177Lu-PSMA-617 treatment was a reasonable approach.  This began on 12/5/2024.  He received his 3rd dose of 177Lu-PSMA-617 on 2/27/2025, with a plan to deliver up to 6 doses.  His PSMA-PET scan from 2/26/2025 showed improvement of liver lesions (see report, above).      We took some time today to review potential management options for the future, if his cancer becomes resistant to 177Lu-PSMA-617 moving forward.  This would likely require more chemotherapy, such as the combination of cabazitaxel plus carboplatin.  Alternatively, a separate chemotherapy regimen could include cisplatin plus etoposide.  These could be delivered by Dr. Aguiar.  In addition, I eagerly await the results of the tissue-based and a blood-based NGS assays from [facility] in case we discover any actionable genetic alterations or a TMB of >10 muts/Mb.  Unfortunately, his most recent liquid biopsy analysis (1/7/2025) continues to show a low TMB of <10 muts/Mb    I also spent some time reviewing our clinical trial portfolio with him.  In the future, he might potentially be eligible for the ELLIE-280 study utilizing a [id number] CD3-engaging bispecific antibody agent.  Another option in the Convergent-01 trial consisting of a PSMA-targeting radioantibody that delivers Ac225.  There might also be additional clinical trials offered  by [facility] (Phase 1 Trial Group).  Furthermore, there are other institutions that specialize in amphicrine or aggressive-variant prostate cancers such as [facility] or [facility].  He may consider a consultation at one of those centers.  ADDENDUM: We have decided not to open the Convergent-01 trial, and the ELLIE-280 trial appears to be closing due to lack of efficacy.  We are planning to open some alternative trials in the future (ARDX-0405, Quav078, ECR525, ACE-232) but these are not available yet.    A total of 40 minutes were spent on this patient on the date of the encounter conducting chart review, review of test results, interpretation of tests, patient visit, documentation and discussion with other provider(s).  The patient was given the opportunity to ask multiple questions today, all of which were answered to his satisfaction.    Nicanor Fitzpatrick M.D.      Again, thank you for allowing me to participate in the care of your patient.        Sincerely,        Nicanor Fitzpatrick MD    Electronically signed

## 2025-03-03 NOTE — NURSING NOTE
Oncology Rooming Note    March 3, 2025 12:14 PM   Johnnie Huggins is a 59 year old male who presents for:    Chief Complaint   Patient presents with    Oncology Clinic Visit     Malignant neoplasm of prostate     Initial Vitals: /73 (BP Location: Right arm, Patient Position: Sitting, Cuff Size: Adult Regular)   Pulse 79   Temp 98.1  F (36.7  C) (Oral)   Resp 18   Wt 95.3 kg (210 lb)   SpO2 97%   BMI 28.48 kg/m   Estimated body mass index is 28.48 kg/m  as calculated from the following:    Height as of 1/15/25: 1.829 m (6').    Weight as of this encounter: 95.3 kg (210 lb). Body surface area is 2.2 meters squared.  Moderate Pain (6) Comment: from 0 to 6, intermittent   No LMP for male patient.  Allergies reviewed: Yes  Medications reviewed: Yes    Medications: Medication refills not needed today.  Pharmacy name entered into [employer]: [id number] - [place], [place] - [facility] [address] AT [facility] [address] [facility]    Frailty Screening:   Is the patient here for a new oncology consult visit in cancer care? 2. No    PHQ9:  Did this patient require a PHQ9?: No      Clinical concerns: none      Jade Nickerson